# Patient Record
Sex: MALE | URBAN - METROPOLITAN AREA
[De-identification: names, ages, dates, MRNs, and addresses within clinical notes are randomized per-mention and may not be internally consistent; named-entity substitution may affect disease eponyms.]

---

## 2017-07-31 ENCOUNTER — IMPORTED ENCOUNTER (OUTPATIENT)
Dept: URBAN - METROPOLITAN AREA CLINIC 50 | Facility: CLINIC | Age: 62
End: 2017-07-31

## 2017-08-01 ENCOUNTER — IMPORTED ENCOUNTER (OUTPATIENT)
Dept: URBAN - METROPOLITAN AREA CLINIC 50 | Facility: CLINIC | Age: 62
End: 2017-08-01

## 2017-08-15 ENCOUNTER — IMPORTED ENCOUNTER (OUTPATIENT)
Dept: URBAN - METROPOLITAN AREA CLINIC 50 | Facility: CLINIC | Age: 62
End: 2017-08-15

## 2017-10-24 ENCOUNTER — IMPORTED ENCOUNTER (OUTPATIENT)
Dept: URBAN - METROPOLITAN AREA CLINIC 50 | Facility: CLINIC | Age: 62
End: 2017-10-24

## 2017-11-07 ENCOUNTER — IMPORTED ENCOUNTER (OUTPATIENT)
Dept: URBAN - METROPOLITAN AREA CLINIC 50 | Facility: CLINIC | Age: 62
End: 2017-11-07

## 2017-11-14 ENCOUNTER — IMPORTED ENCOUNTER (OUTPATIENT)
Dept: URBAN - METROPOLITAN AREA CLINIC 50 | Facility: CLINIC | Age: 62
End: 2017-11-14

## 2017-12-19 ENCOUNTER — IMPORTED ENCOUNTER (OUTPATIENT)
Dept: URBAN - METROPOLITAN AREA CLINIC 50 | Facility: CLINIC | Age: 62
End: 2017-12-19

## 2017-12-20 ENCOUNTER — IMPORTED ENCOUNTER (OUTPATIENT)
Dept: URBAN - METROPOLITAN AREA CLINIC 50 | Facility: CLINIC | Age: 62
End: 2017-12-20

## 2018-02-16 ENCOUNTER — IMPORTED ENCOUNTER (OUTPATIENT)
Dept: URBAN - METROPOLITAN AREA CLINIC 50 | Facility: CLINIC | Age: 63
End: 2018-02-16

## 2018-03-16 ENCOUNTER — IMPORTED ENCOUNTER (OUTPATIENT)
Dept: URBAN - METROPOLITAN AREA CLINIC 50 | Facility: CLINIC | Age: 63
End: 2018-03-16

## 2018-03-19 ENCOUNTER — IMPORTED ENCOUNTER (OUTPATIENT)
Dept: URBAN - METROPOLITAN AREA CLINIC 50 | Facility: CLINIC | Age: 63
End: 2018-03-19

## 2018-07-31 ENCOUNTER — IMPORTED ENCOUNTER (OUTPATIENT)
Dept: URBAN - METROPOLITAN AREA CLINIC 50 | Facility: CLINIC | Age: 63
End: 2018-07-31

## 2018-11-02 NOTE — PATIENT DISCUSSION
(H25.13) Age-related nuclear cataract, bilateral - Assesment : Examination revealed cataract. Mild symptoms with visual function affected. - Plan : Monitor for changes. Advised patient to call our office with decreased vision or increased symptoms. Updated glasses Rx with minimal changes given to patient.

## 2018-11-02 NOTE — PATIENT DISCUSSION
(Q90.150) Keratoconjunct sicca, not specified as Sjogren's, bilateral - Assesment : Examination revealed Dry Eye Syndrome OU. OS>OD, with some lagophthalmos OS. - Plan : Monitor for changes. Advised patient to call our office with decreased vision or increased symptoms. Continue artificial tears OU BID-TID and PRN for comfort. Explained condition to patient and answered all questions. RV in 1 Year for Complete Exam and Macular OCT, sooner with problems or changes in vision. Letter sent to Dr. Adali Mejia explaining findings.

## 2018-11-02 NOTE — PATIENT DISCUSSION
(H25.013) Cortical age-related cataract, bilateral - Assesment : Examination revealed Cortical Senile Cataract OU. Mild. - Plan : See Plan #2.

## 2019-03-08 ENCOUNTER — IMPORTED ENCOUNTER (OUTPATIENT)
Dept: URBAN - METROPOLITAN AREA CLINIC 50 | Facility: CLINIC | Age: 64
End: 2019-03-08

## 2019-03-11 ENCOUNTER — IMPORTED ENCOUNTER (OUTPATIENT)
Dept: URBAN - METROPOLITAN AREA CLINIC 50 | Facility: CLINIC | Age: 64
End: 2019-03-11

## 2019-03-12 ENCOUNTER — IMPORTED ENCOUNTER (OUTPATIENT)
Dept: URBAN - METROPOLITAN AREA CLINIC 50 | Facility: CLINIC | Age: 64
End: 2019-03-12

## 2019-04-22 ENCOUNTER — IMPORTED ENCOUNTER (OUTPATIENT)
Dept: URBAN - METROPOLITAN AREA CLINIC 50 | Facility: CLINIC | Age: 64
End: 2019-04-22

## 2019-07-15 ENCOUNTER — IMPORTED ENCOUNTER (OUTPATIENT)
Dept: URBAN - METROPOLITAN AREA CLINIC 50 | Facility: CLINIC | Age: 64
End: 2019-07-15

## 2019-08-29 ENCOUNTER — IMPORTED ENCOUNTER (OUTPATIENT)
Dept: URBAN - METROPOLITAN AREA CLINIC 50 | Facility: CLINIC | Age: 64
End: 2019-08-29

## 2019-11-21 ENCOUNTER — IMPORTED ENCOUNTER (OUTPATIENT)
Dept: URBAN - METROPOLITAN AREA CLINIC 50 | Facility: CLINIC | Age: 64
End: 2019-11-21

## 2019-12-19 ENCOUNTER — IMPORTED ENCOUNTER (OUTPATIENT)
Dept: URBAN - METROPOLITAN AREA CLINIC 50 | Facility: CLINIC | Age: 64
End: 2019-12-19

## 2020-01-06 ENCOUNTER — IMPORTED ENCOUNTER (OUTPATIENT)
Dept: URBAN - METROPOLITAN AREA CLINIC 50 | Facility: CLINIC | Age: 65
End: 2020-01-06

## 2020-03-19 ENCOUNTER — IMPORTED ENCOUNTER (OUTPATIENT)
Dept: URBAN - METROPOLITAN AREA CLINIC 50 | Facility: CLINIC | Age: 65
End: 2020-03-19

## 2020-03-20 ENCOUNTER — IMPORTED ENCOUNTER (OUTPATIENT)
Dept: URBAN - METROPOLITAN AREA CLINIC 50 | Facility: CLINIC | Age: 65
End: 2020-03-20

## 2020-03-23 ENCOUNTER — IMPORTED ENCOUNTER (OUTPATIENT)
Dept: URBAN - METROPOLITAN AREA CLINIC 50 | Facility: CLINIC | Age: 65
End: 2020-03-23

## 2020-04-06 ENCOUNTER — IMPORTED ENCOUNTER (OUTPATIENT)
Dept: URBAN - METROPOLITAN AREA CLINIC 50 | Facility: CLINIC | Age: 65
End: 2020-04-06

## 2020-06-15 ENCOUNTER — IMPORTED ENCOUNTER (OUTPATIENT)
Dept: URBAN - METROPOLITAN AREA CLINIC 50 | Facility: CLINIC | Age: 65
End: 2020-06-15

## 2020-07-27 ENCOUNTER — IMPORTED ENCOUNTER (OUTPATIENT)
Dept: URBAN - METROPOLITAN AREA CLINIC 50 | Facility: CLINIC | Age: 65
End: 2020-07-27

## 2020-08-31 ENCOUNTER — IMPORTED ENCOUNTER (OUTPATIENT)
Dept: URBAN - METROPOLITAN AREA CLINIC 50 | Facility: CLINIC | Age: 65
End: 2020-08-31

## 2020-10-09 ENCOUNTER — IMPORTED ENCOUNTER (OUTPATIENT)
Dept: URBAN - METROPOLITAN AREA CLINIC 50 | Facility: CLINIC | Age: 65
End: 2020-10-09

## 2020-10-19 ENCOUNTER — IMPORTED ENCOUNTER (OUTPATIENT)
Dept: URBAN - METROPOLITAN AREA CLINIC 50 | Facility: CLINIC | Age: 65
End: 2020-10-19

## 2020-10-26 ENCOUNTER — IMPORTED ENCOUNTER (OUTPATIENT)
Dept: URBAN - METROPOLITAN AREA CLINIC 50 | Facility: CLINIC | Age: 65
End: 2020-10-26

## 2020-11-23 ENCOUNTER — IMPORTED ENCOUNTER (OUTPATIENT)
Dept: URBAN - METROPOLITAN AREA CLINIC 50 | Facility: CLINIC | Age: 65
End: 2020-11-23

## 2020-12-08 ENCOUNTER — IMPORTED ENCOUNTER (OUTPATIENT)
Dept: URBAN - METROPOLITAN AREA CLINIC 50 | Facility: CLINIC | Age: 65
End: 2020-12-08

## 2020-12-11 ENCOUNTER — IMPORTED ENCOUNTER (OUTPATIENT)
Dept: URBAN - METROPOLITAN AREA CLINIC 50 | Facility: CLINIC | Age: 65
End: 2020-12-11

## 2021-01-26 ENCOUNTER — IMPORTED ENCOUNTER (OUTPATIENT)
Dept: URBAN - METROPOLITAN AREA CLINIC 50 | Facility: CLINIC | Age: 66
End: 2021-01-26

## 2021-02-10 ENCOUNTER — IMPORTED ENCOUNTER (OUTPATIENT)
Dept: URBAN - METROPOLITAN AREA CLINIC 50 | Facility: CLINIC | Age: 66
End: 2021-02-10

## 2021-02-19 ENCOUNTER — IMPORTED ENCOUNTER (OUTPATIENT)
Dept: URBAN - METROPOLITAN AREA CLINIC 50 | Facility: CLINIC | Age: 66
End: 2021-02-19

## 2021-04-14 ENCOUNTER — IMPORTED ENCOUNTER (OUTPATIENT)
Dept: URBAN - METROPOLITAN AREA CLINIC 50 | Facility: CLINIC | Age: 66
End: 2021-04-14

## 2021-04-17 ASSESSMENT — TONOMETRY
OS_IOP_MMHG: 15
OS_IOP_MMHG: 16
OD_IOP_MMHG: 33
OD_IOP_MMHG: 49
OS_IOP_MMHG: 14
OD_IOP_MMHG: 55
OS_IOP_MMHG: 17
OS_IOP_MMHG: 16
OD_IOP_MMHG: 25
OD_IOP_MMHG: 56
OD_IOP_MMHG: 13
OS_IOP_MMHG: 15
OD_IOP_MMHG: 25
OS_IOP_MMHG: 15
OS_IOP_MMHG: 14
OS_IOP_MMHG: 16
OD_IOP_MMHG: 28
OS_IOP_MMHG: 15
OD_IOP_MMHG: 10
OD_IOP_MMHG: 27
OD_IOP_MMHG: 26
OD_IOP_MMHG: 16
OD_IOP_MMHG: 26
OS_IOP_MMHG: 15
OD_IOP_MMHG: 25
OS_IOP_MMHG: 15
OD_IOP_MMHG: 35
OS_IOP_MMHG: 15
OD_IOP_MMHG: 47
OS_IOP_MMHG: 18
OD_IOP_MMHG: 12
OD_IOP_MMHG: 22
OS_IOP_MMHG: 18
OS_IOP_MMHG: 18
OS_IOP_MMHG: 15
OD_IOP_MMHG: 17
OS_IOP_MMHG: 16
OS_IOP_MMHG: 16
OD_IOP_MMHG: 39
OD_IOP_MMHG: 23
OD_IOP_MMHG: 29
OD_IOP_MMHG: 41
OS_IOP_MMHG: 12

## 2021-04-17 ASSESSMENT — VISUAL ACUITY
OS_SC: 20/20-2
OS_SC: 20/30
OS_CC: J1
OD_CC: J1
OS_SC: 20/30
OS_SC: 20/25
OS_SC: 20/25-2
OS_CC: 20/25
OS_SC: 20/25
OS_SC: 20/30+2
OS_OTHER: 20/40. 20/80.
OS_CC: 20/20
OS_PH: 20/25-
OS_SC: 20/25
OS_SC: 20/30-
OS_SC: 20/30-1
OS_SC: 20/25
OS_BAT: 20/40
OS_SC: 20/20
OS_SC: 20/30-1
OS_OTHER: 20/40. 20/60.
OS_CC: 20/25-1
OS_PH: @ 17 IN
OS_BAT: 20/40
OS_SC: 20/30-

## 2021-04-17 ASSESSMENT — PACHYMETRY
OD_CT_UM: 681
OS_CT_UM: 563
OS_CT_UM: 563
OD_CT_UM: 681
OD_CT_UM: 681
OS_CT_UM: 563
OS_CT_UM: 563
OD_CT_UM: 681

## 2021-05-21 ENCOUNTER — PREPPED CHART (OUTPATIENT)
Dept: URBAN - METROPOLITAN AREA CLINIC 50 | Facility: CLINIC | Age: 66
End: 2021-05-21

## 2021-05-24 ENCOUNTER — COMPREHENSIVE EXAM (OUTPATIENT)
Dept: URBAN - METROPOLITAN AREA CLINIC 50 | Facility: CLINIC | Age: 66
End: 2021-05-24

## 2021-05-24 DIAGNOSIS — H40.89: ICD-10-CM

## 2021-05-24 DIAGNOSIS — H34.8112: ICD-10-CM

## 2021-05-24 PROCEDURE — 92014 COMPRE OPH EXAM EST PT 1/>: CPT

## 2021-05-24 RX ORDER — BRIMONIDINE TARTRATE 2 MG/MG: 1 SOLUTION/ DROPS OPHTHALMIC

## 2021-05-24 RX ORDER — TRAVOPROST 0.04 MG/ML: 1 SOLUTION/ DROPS OPHTHALMIC

## 2021-05-24 RX ORDER — DORZOLAMIDE HYDROCHLORIDE TIMOLOL MALEATE 20; 5 MG/ML; MG/ML: 1 SOLUTION/ DROPS OPHTHALMIC

## 2021-05-24 ASSESSMENT — VISUAL ACUITY
OS_GLARE: 20/40
OS_GLARE: 20/100
OS_SC: 20/25-1

## 2021-05-24 ASSESSMENT — TONOMETRY
OS_IOP_MMHG: 18
OD_IOP_MMHG: 33

## 2021-08-04 ENCOUNTER — PROBLEM (OUTPATIENT)
Dept: URBAN - METROPOLITAN AREA CLINIC 53 | Facility: CLINIC | Age: 66
End: 2021-08-04

## 2021-08-04 DIAGNOSIS — H40.89: ICD-10-CM

## 2021-08-04 PROCEDURE — 92012 INTRM OPH EXAM EST PATIENT: CPT

## 2021-08-04 RX ORDER — TRAVOPROST 0.04 MG/ML
1 SOLUTION/ DROPS OPHTHALMIC
Start: 2021-08-04

## 2021-08-04 ASSESSMENT — TONOMETRY
OS_IOP_MMHG: 17
OD_IOP_MMHG: 43
OS_IOP_MMHG: 16

## 2021-08-04 ASSESSMENT — VISUAL ACUITY: OS_SC: 20/30

## 2021-08-30 ENCOUNTER — FOLLOW UP (OUTPATIENT)
Dept: URBAN - METROPOLITAN AREA CLINIC 50 | Facility: CLINIC | Age: 66
End: 2021-08-30

## 2021-08-30 DIAGNOSIS — H40.89: ICD-10-CM

## 2021-08-30 PROCEDURE — 92012 INTRM OPH EXAM EST PATIENT: CPT

## 2021-08-30 RX ORDER — OFLOXACIN 3 MG/ML: 1 SOLUTION/ DROPS OPHTHALMIC TWICE A DAY

## 2021-08-30 ASSESSMENT — TONOMETRY
OD_IOP_MMHG: 14
OS_IOP_MMHG: 12
OS_IOP_MMHG: 13
OD_IOP_MMHG: 10

## 2021-08-30 ASSESSMENT — VISUAL ACUITY
OS_PH: 20/25+2
OU_SC: 20/30
OS_SC: 20/30

## 2021-10-04 ENCOUNTER — 1 MONTH FOLLOW-UP (OUTPATIENT)
Dept: URBAN - METROPOLITAN AREA CLINIC 50 | Facility: CLINIC | Age: 66
End: 2021-10-04

## 2021-10-04 DIAGNOSIS — H40.89: ICD-10-CM

## 2021-10-04 PROCEDURE — 92012 INTRM OPH EXAM EST PATIENT: CPT

## 2021-10-04 ASSESSMENT — TONOMETRY
OD_IOP_MMHG: 38
OS_IOP_MMHG: 13
OS_IOP_MMHG: 14
OD_IOP_MMHG: 34

## 2021-10-04 ASSESSMENT — VISUAL ACUITY: OS_SC: 20/25

## 2021-11-22 ENCOUNTER — 4 WEEK FOLLOW-UP (OUTPATIENT)
Dept: URBAN - METROPOLITAN AREA CLINIC 50 | Facility: CLINIC | Age: 66
End: 2021-11-22

## 2021-11-22 DIAGNOSIS — H25.812: ICD-10-CM

## 2021-11-22 DIAGNOSIS — H40.89: ICD-10-CM

## 2021-11-22 PROCEDURE — 92012 INTRM OPH EXAM EST PATIENT: CPT

## 2021-11-22 ASSESSMENT — TONOMETRY
OD_IOP_MMHG: 20
OD_IOP_MMHG: 42
OS_IOP_MMHG: 16
OS_IOP_MMHG: 17

## 2021-11-22 ASSESSMENT — VISUAL ACUITY: OS_SC: 20/25

## 2022-01-31 ENCOUNTER — FOLLOW UP (OUTPATIENT)
Dept: URBAN - METROPOLITAN AREA CLINIC 50 | Facility: CLINIC | Age: 67
End: 2022-01-31

## 2022-01-31 DIAGNOSIS — H25.812: ICD-10-CM

## 2022-01-31 DIAGNOSIS — H40.89: ICD-10-CM

## 2022-01-31 PROCEDURE — 92012 INTRM OPH EXAM EST PATIENT: CPT

## 2022-01-31 ASSESSMENT — TONOMETRY
OD_IOP_MMHG: 46
OS_IOP_MMHG: 21
OD_IOP_MMHG: 50
OS_IOP_MMHG: 22

## 2022-01-31 ASSESSMENT — VISUAL ACUITY: OS_SC: 20/25

## 2022-04-12 ENCOUNTER — PREPPED CHART (OUTPATIENT)
Dept: URBAN - METROPOLITAN AREA CLINIC 50 | Facility: CLINIC | Age: 67
End: 2022-04-12

## 2022-05-09 ENCOUNTER — COMPREHENSIVE EXAM (OUTPATIENT)
Dept: URBAN - METROPOLITAN AREA CLINIC 50 | Facility: CLINIC | Age: 67
End: 2022-05-09

## 2022-05-09 DIAGNOSIS — H40.89: ICD-10-CM

## 2022-05-09 DIAGNOSIS — H40.012: ICD-10-CM

## 2022-05-09 DIAGNOSIS — H25.812: ICD-10-CM

## 2022-05-09 PROCEDURE — 76514 ECHO EXAM OF EYE THICKNESS: CPT

## 2022-05-09 PROCEDURE — 92133 CPTRZD OPH DX IMG PST SGM ON: CPT

## 2022-05-09 PROCEDURE — 92083 EXTENDED VISUAL FIELD XM: CPT

## 2022-05-09 PROCEDURE — 92014 COMPRE OPH EXAM EST PT 1/>: CPT

## 2022-05-09 ASSESSMENT — TONOMETRY
OS_IOP_MMHG: 15
OS_IOP_MMHG: 14

## 2022-05-09 ASSESSMENT — VISUAL ACUITY
OS_PH: 20/20
OS_SC: 20/30

## 2022-05-09 ASSESSMENT — PACHYMETRY
OS_CT_UM: 563
OD_CT_UM: 457

## 2022-08-01 ENCOUNTER — ESTABLISHED PATIENT (OUTPATIENT)
Dept: URBAN - METROPOLITAN AREA CLINIC 50 | Facility: CLINIC | Age: 67
End: 2022-08-01

## 2022-08-01 DIAGNOSIS — H25.812: ICD-10-CM

## 2022-08-01 DIAGNOSIS — H40.012: ICD-10-CM

## 2022-08-01 DIAGNOSIS — H40.89: ICD-10-CM

## 2022-08-01 PROCEDURE — 92012 INTRM OPH EXAM EST PATIENT: CPT

## 2022-08-01 ASSESSMENT — TONOMETRY
OD_IOP_MMHG: 39
OD_IOP_MMHG: 45
OS_IOP_MMHG: 13
OS_IOP_MMHG: 14

## 2022-08-01 ASSESSMENT — VISUAL ACUITY
OU_SC: 20/30-2
OS_SC: 20/30-1

## 2022-09-12 ENCOUNTER — ESTABLISHED PATIENT (OUTPATIENT)
Dept: URBAN - METROPOLITAN AREA CLINIC 50 | Facility: CLINIC | Age: 67
End: 2022-09-12

## 2022-09-12 DIAGNOSIS — H40.012: ICD-10-CM

## 2022-09-12 DIAGNOSIS — H40.89: ICD-10-CM

## 2022-09-12 PROCEDURE — 92012 INTRM OPH EXAM EST PATIENT: CPT

## 2022-09-12 ASSESSMENT — VISUAL ACUITY
OS_PH: 20/20-1
OS_SC: 20/30-1

## 2022-09-12 ASSESSMENT — TONOMETRY
OD_IOP_MMHG: 25
OS_IOP_MMHG: 12
OS_IOP_MMHG: 11
OD_IOP_MMHG: 31

## 2022-09-19 ENCOUNTER — CONTACT LENSES/GLASSES VISIT (OUTPATIENT)
Dept: URBAN - METROPOLITAN AREA CLINIC 50 | Facility: CLINIC | Age: 67
End: 2022-09-19

## 2022-09-19 DIAGNOSIS — H52.4: ICD-10-CM

## 2022-09-19 PROCEDURE — 92015 DETERMINE REFRACTIVE STATE: CPT

## 2022-09-19 ASSESSMENT — VISUAL ACUITY
OU_SC: J2
OU_CC: J1+

## 2022-12-05 ENCOUNTER — FOLLOW UP (OUTPATIENT)
Dept: URBAN - METROPOLITAN AREA CLINIC 50 | Facility: CLINIC | Age: 67
End: 2022-12-05

## 2022-12-05 PROCEDURE — 92012 INTRM OPH EXAM EST PATIENT: CPT

## 2022-12-05 ASSESSMENT — TONOMETRY
OD_IOP_MMHG: 51
OS_IOP_MMHG: 14
OS_IOP_MMHG: 13
OD_IOP_MMHG: 57

## 2022-12-05 ASSESSMENT — VISUAL ACUITY
OS_SC: 20/25+1
OU_SC: 20/25+1

## 2023-06-26 ENCOUNTER — COMPREHENSIVE EXAM (OUTPATIENT)
Dept: URBAN - METROPOLITAN AREA CLINIC 50 | Facility: CLINIC | Age: 68
End: 2023-06-26

## 2023-06-26 DIAGNOSIS — H34.8112: ICD-10-CM

## 2023-06-26 DIAGNOSIS — E11.9: ICD-10-CM

## 2023-06-26 DIAGNOSIS — H40.89: ICD-10-CM

## 2023-06-26 DIAGNOSIS — H25.89: ICD-10-CM

## 2023-06-26 DIAGNOSIS — H25.812: ICD-10-CM

## 2023-06-26 DIAGNOSIS — H40.012: ICD-10-CM

## 2023-06-26 PROCEDURE — 92014 COMPRE OPH EXAM EST PT 1/>: CPT

## 2023-06-26 ASSESSMENT — TONOMETRY
OD_IOP_MMHG: 10
OS_IOP_MMHG: 14
OS_IOP_MMHG: 15

## 2023-06-26 ASSESSMENT — VISUAL ACUITY: OS_SC: 20/40+2

## 2023-09-15 ENCOUNTER — ESTABLISHED PATIENT (OUTPATIENT)
Dept: URBAN - METROPOLITAN AREA CLINIC 50 | Facility: LOCATION | Age: 68
End: 2023-09-15

## 2023-09-15 DIAGNOSIS — H40.89: ICD-10-CM

## 2023-09-15 PROCEDURE — 99213 OFFICE O/P EST LOW 20 MIN: CPT

## 2023-09-15 RX ORDER — LATANOPROST 50 UG/ML
1 SOLUTION/ DROPS OPHTHALMIC EVERY EVENING
Start: 2023-09-15

## 2023-09-15 ASSESSMENT — TONOMETRY
OD_IOP_MMHG: 35
OS_IOP_MMHG: 10
OS_IOP_MMHG: 11
OD_IOP_MMHG: 29

## 2023-09-15 ASSESSMENT — VISUAL ACUITY
OS_SC: 20/30-2
OS_PH: 20/25

## 2023-11-06 ENCOUNTER — FOLLOW UP (OUTPATIENT)
Dept: URBAN - METROPOLITAN AREA CLINIC 50 | Facility: LOCATION | Age: 68
End: 2023-11-06

## 2023-11-06 DIAGNOSIS — H40.89: ICD-10-CM

## 2023-11-06 DIAGNOSIS — H40.012: ICD-10-CM

## 2023-11-06 PROCEDURE — 92012 INTRM OPH EXAM EST PATIENT: CPT

## 2023-11-06 ASSESSMENT — TONOMETRY
OS_IOP_MMHG: 17
OS_IOP_MMHG: 16
OD_IOP_MMHG: 43
OD_IOP_MMHG: 37

## 2023-11-06 ASSESSMENT — VISUAL ACUITY: OS_SC: 20/40

## 2024-02-12 ENCOUNTER — FOLLOW UP (OUTPATIENT)
Dept: URBAN - METROPOLITAN AREA CLINIC 50 | Facility: LOCATION | Age: 69
End: 2024-02-12

## 2024-02-12 DIAGNOSIS — H40.012: ICD-10-CM

## 2024-02-12 DIAGNOSIS — H40.89: ICD-10-CM

## 2024-02-12 PROCEDURE — 99214 OFFICE O/P EST MOD 30 MIN: CPT

## 2024-02-12 PROCEDURE — 92015 DETERMINE REFRACTIVE STATE: CPT

## 2024-02-12 ASSESSMENT — TONOMETRY
OS_IOP_MMHG: 17
OS_IOP_MMHG: 16

## 2024-02-12 ASSESSMENT — VISUAL ACUITY
OS_GLARE: 20/30
OS_GLARE: 20/40
OS_SC: 20/25+2